# Patient Record
Sex: MALE | Race: WHITE | Employment: OTHER | URBAN - METROPOLITAN AREA
[De-identification: names, ages, dates, MRNs, and addresses within clinical notes are randomized per-mention and may not be internally consistent; named-entity substitution may affect disease eponyms.]

---

## 2019-09-05 ENCOUNTER — HOSPITAL ENCOUNTER (OUTPATIENT)
Dept: PHYSICAL THERAPY | Age: 39
Discharge: HOME | End: 2019-09-05
Payer: COMMERCIAL

## 2019-09-05 PROCEDURE — 97110 THERAPEUTIC EXERCISES: CPT

## 2019-09-05 PROCEDURE — 97161 PT EVAL LOW COMPLEX 20 MIN: CPT

## 2019-09-05 NOTE — PROGRESS NOTES
[address] [facility] PHYSICAL THERAPY AT [facility] [address] [facility] [address] - Phone: [phone]  Fax: [phone] / [address] [facility]  Patient Name: Jhony Mandel : 1980   Medical   Diagnosis: Left shoulder pain [M25.512] Treatment Diagnosis: L shoulder rotator cuff tear   Onset Date:       Referral Source: Nikole Lau MD Start of Care [facility]): 2019   Prior Hospitalization: See medical history Provider #: [id number]   Prior Level of Function: Chronic L shoulder pain    Comorbidities: Tobacco use   Medications: Verified on Patient Summary List   The Plan of Care and following information is based on the information from the initial evaluation.   ==============================================================================  Assessment / key information:   Jhony Mandel is a 37 y.o. male with a chief c/o constant R shoulder pain, up to 10/10. The patient reports 3-4 years of L shoulder pain form multiple injuries. He reports a recent MRI shows a small RCT,and OA. He reports episodes of what sound like subluxations, ewith brief intense pain, usually with overhead, horizontal adduction or ER at 90 degrees of abduction. He has full AROM, except for functional reach behind his back (MRE), which is limited to T9 (T4 R). His strength is good, for all planes except for abd, which is 4+/5, limited by pain. FOTO score = 52.   The patient would benefit from skilled physical therapy at this time.  ===========================================================================================  Eval Complexity: History LOW Complexity : Zero comorbidities / personal factors that will impact the outcome / POC;  Examination  HIGH Complexity : 4+ Standardized tests and measures addressing body structure, function, activity limitation and / or participation in recreation ; Presentation LOW Complexity : Stable, uncomplicated ;  Decision Making MEDIUM Complexity : FOTO score of 26-74; Overall Complexity LOW   Problem List: pain affecting function, decrease ROM, decrease strength, decrease ADL/ functional abilitiies, decrease activity tolerance and decrease flexibility/ joint mobility   Treatment Plan may include any combination of the following: Therapeutic exercise, Therapeutic activities, Neuromuscular re-education, Physical agent/modality, Manual therapy and Patient education  Patient / Family readiness to learn indicated by: asking questions, trying to perform skills and interest  Persons(s) to be included in education: patient (P)  Barriers to Learning/Limitations: None  Measures taken, if barriers to learning:    Patient Goal (s): \"reduction of pain\"   Patient self reported health status: fair  Rehabilitation Potential: good   Short Term Goals: To be accomplished in  2  weeks:  1. Decrease max L shoulder pain to < 6/10  2. Pt compliant with HEP   Long Term Goals: To be accomplished in  4-5  weeks:  1. All ADL's without L shoulder pain  2. Increase FOTO score to > 65, to indicate increased function  3. Pt able to consistently perform overhead work duties without pain  4. Patient Independent with HEP/selfcare    Frequency / Duration:   Patient to be seen 2-3  times per week for 4-8  weeks:  Patient / Caregiver education and instruction: self care, activity modification and exercises  Therapist Signature: Dunia Thomson PT Date: 6/2/6529   Certification Period: NA Time: 4:36 PM   ===========================================================================================  I certify that the above Physical Therapy Services are being furnished while the patient is under my care. I agree with the treatment plan and certify that this therapy is necessary.     Physician Signature:        Date:       Time:     Please sign and return to [facility] or you may fax the signed copy to [phone]. Thank you.

## 2019-09-05 NOTE — PROGRESS NOTES
PHYSICAL THERAPY - DAILY TREATMENT NOTE    Patient Name: Denny Price        Date: 2019  : 1980   YES Patient  Verified  Visit #:     Insurance: Payor: /      In time: 3:50 Out time: 4:30   Total Treatment Time: 40     Medicare Time Tracking (below)   Total Timed Codes (min):  15 1:1 Treatment Time:  15     TREATMENT AREA =  L shoulder    SUBJECTIVE    Pain Level (on 0 to 10 scale):  3  / 10   Medication Changes/New allergies or changes in medical history, any new surgeries or procedures?     NO    If yes, update Summary List   Subjective Functional Status/Changes:  []  No changes reported     See eval          OBJECTIVE  Modalities Rationale:     decrease edema, decrease inflammation and decrease pain to improve patient's ability to perform ADLs without pain   min [] Estim, type/location:                                      []  att     []  unatt     []  w/US     []  w/ice    []  w/heat    min []  Mechanical Traction: type/lbs                   []  pro   []  sup   []  int   []  cont    []  before manual    []  after manual    min []  Ultrasound, settings/location:      min []  Iontophoresis w/ dexamethasone, location:                                               []  take home patch       []  in clinic   10 min [x]  Ice     []  Heat    location/position:     min []  Vasopneumatic Device, press/temp:     min []  Other:    [x] Skin assessment post-treatment (if applicable):    [x]  intact    []  redness- no adverse reaction     []redness  adverse reaction:        15 min Therapeutic Exercise:  [x]  See flow sheet   Rationale:      increase ROM and improve coordination to improve the patients ability to perform ADLs without pain     Billed With/As:   [x] TE   [] TA   [] Neuro   [] Self Care Patient Education: [x] Review HEP    [] Progressed/Changed HEP based on:   [] positioning   [] body mechanics   [] transfers   [] heat/ice application    [] other:       min Patient Education:  Yanique Wagoner Reviewed HEP   []  Progressed/Changed HEP based on:         Other Objective/Functional Measures:    See eval     Post Treatment Pain Level (on 0 to 10) scale:   1  / 10     ASSESSMENT    []  See Progress Note/Recertification   Patient will continue to benefit from skilled therapy to address remaining functional deficits: see eval   Progress toward goals / Updated goals:    -     PLAN    [x]  Upgrade activities as tolerated YES Continue plan of care   []  Discharge due to :    []  Other:      Therapist: Emely Londono PT    Date: 9/5/2019 Time: 8:21 AM

## 2019-09-11 ENCOUNTER — HOSPITAL ENCOUNTER (OUTPATIENT)
Dept: PHYSICAL THERAPY | Age: 39
Discharge: HOME | End: 2019-09-11
Payer: COMMERCIAL

## 2019-09-11 PROCEDURE — 97140 MANUAL THERAPY 1/> REGIONS: CPT

## 2019-09-11 PROCEDURE — 97110 THERAPEUTIC EXERCISES: CPT

## 2019-09-11 NOTE — PROGRESS NOTES
PHYSICAL THERAPY - DAILY TREATMENT NOTE     Patient Name: Bravo Cuevas        Date: 2019  : 1980   YES Patient  Verified  Visit #:     Insurance: [facility]or: [facility] [address] / Plan: [address] / Product Type: Managed Care Medicaid /      In time:  Out time: 1100   Total Treatment Time: 55     Medicare/BCBS Time Tracking (below)   Total Timed Codes (min):   1:1 Treatment Time:       TREATMENT AREA =  Left shoulder pain [M25.512]    SUBJECTIVE    Pain Level (on 0 to 10 scale):  3  / 10   Medication Changes/New allergies or changes in medical history, any new surgeries or procedures?     NO    If yes, update Summary List   Subjective Functional Status/Changes:  []  No changes reported   Pain when I use the arm           OBJECTIVE  Modalities Rationale:     decrease inflammation and decrease pain to improve patient's ability to use arm with less pain      min [] Estim, type/location:                                      []  att     []  unatt     []  w/US     []  w/ice    []  w/heat    min []  Mechanical Traction: type/lbs                   []  pro   []  sup   []  int   []  cont    []  before manual    []  after manual    min []  Ultrasound, settings/location:      min []  Iontophoresis w/ dexamethasone, location:                                               []  take home patch       []  in clinic   10 min [x]  Ice     []  Heat    location/position:     min []  Vasopneumatic Device, press/temp:     min []  Other:    [x] Skin assessment post-treatment (if applicable):    [x]  intact    []  redness- no adverse reaction     []redness  adverse reaction:      5 min Manual Therapy: Technique:      [] S/DTM []IASTM []PROM [] Passive Stretching   []manual TPR  [] SOR [] man traction  []Jt manipulation:Gr I [] II []  III [] IV[]   [] OP with REIL    []   Treatment Area:     Rationale:      decrease pain, increase ROM, increase tissue extensibility and decrease trigger points to improve patient's ability to perform ADLs/ recreational activity with less pain    40 min Therapeutic Exercise:  [x]  See flow sheet   Rationale:      increase ROM and increase strength to improve the patients ability to perform ADLs/ recreational activity without pain         Billed With/As:   [] TE   [] TA   [] Neuro   [] Self Care Patient Education: [x] Review HEP    [] Progressed/Changed HEP based on:   [] positioning   [] body mechanics   [] transfers   [] heat/ice application    [] other:        Other Objective/Functional Measures:    Progressed HEP. See flow sheet   Post Treatment Pain Level (on 0 to 10) scale:   3  / 10     ASSESSMENT  Assessment/Changes in Function:      Functional improvement:  progressed HEP  Functional limitation: work activity requires working in awkward positions and with sign force           Patient will continue to benefit from skilled PT services to modify and progress therapeutic interventions, address functional mobility deficits, address ROM deficits, address strength deficits, analyze and address soft tissue restrictions and analyze and cue movement patterns to attain remaining goals.    Progress toward goals / Updated goals:    Good Progress to    [] STG    [x] LTG  4 as shown by progression of there ex     []  See Progress Note/Recertification      PLAN    [x]  Upgrade activities as tolerated {YES) Continue plan of care   []  Discharge due to :    []  Other:      Therapist: Mike Oh PT    Date: 9/11/2019 Time: 10:09 AM     Future Appointments   Date Time Provider Yrn Goncalves   9/13/2019 11:00 AM Evelia Yip PT REHAB CENTER AT [facility]   9/18/2019 12:30 PM Alyson Rasmussen PT REHAB CENTER AT [facility]   9/20/2019  9:00 AM Gissel Millan PT REHAB CENTER AT [facility]   9/25/2019  8:30 AM Gissel Millan PT REHAB CENTER AT [facility]   9/27/2019 11:30 AM Evelia Yip, PT REHAB CENTER AT [facility]

## 2019-09-13 ENCOUNTER — HOSPITAL ENCOUNTER (OUTPATIENT)
Dept: PHYSICAL THERAPY | Age: 39
Discharge: HOME | End: 2019-09-13
Payer: COMMERCIAL

## 2019-09-13 PROCEDURE — 97110 THERAPEUTIC EXERCISES: CPT

## 2019-09-13 NOTE — PROGRESS NOTES
PHYSICAL THERAPY - DAILY TREATMENT NOTE     Patient Name: Miranda Confer        Date: 2019  : 1980   YES Patient  Verified  Visit #:   3   of   12  Insurance: Payor: [address] [place] [address] Road / Plan: [address] / Product Type: Managed Care Medicaid /      In time: 46 Out time: 111   Total Treatment Time: 55     Medicare/[employer] Time Tracking (below)   Total Timed Codes (min):   1:1 Treatment Time:       TREATMENT AREA =  Left shoulder pain [M25.512]    SUBJECTIVE    Pain Level (on 0 to 10 scale):  3  / 10   Medication Changes/New allergies or changes in medical history, any new surgeries or procedures? NO    If yes, update Summary List   Subjective Functional Status/Changes:  []  No changes reported   Pt 15 min late for scheduled appt. Noting limited changes in function but has been doing his HEP and noting improvements in level of activity performed.            OBJECTIVE  Modalities Rationale:     decrease inflammation and decrease pain to improve patient's ability to use arm with less pain   min [] Estim, type/location:                                      []  att     []  unatt     []  w/US     []  w/ice    []  w/heat    min []  Mechanical Traction: type/lbs                   []  pro   []  sup   []  int   []  cont    []  before manual    []  after manual    min []  Ultrasound, settings/location:      min []  Iontophoresis w/ dexamethasone, location:                                               []  take home patch       []  in clinic   10 min [x]  Ice     []  Heat    location/position: L shoulder in supine    min []  Vasopneumatic Device, press/temp:     min []  Other:    [x] Skin assessment post-treatment (if applicable):    [x]  intact    [x]  redness- no adverse reaction     []redness  adverse reaction:      5 min Manual Therapy: Technique:      [] S/DTM []IASTM []PROM [] Passive Stretching   []manual TPR  [] SOR [] man traction  []Jt manipulation:Gr I [] II []  III [] IV[] [] OP with REIL    []   Treatment Area:  L UT, pec and lat release   Rationale:      decrease pain, increase ROM, increase tissue extensibility and decrease trigger points   to improve patient's ability to perform ADLs, reaching, and lifting with less pain    40 min Therapeutic Exercise:  [x]  See flow sheet   Rationale:      increase ROM and increase strength   to improve patients ability to perform ADLs/ recreational activity without pain     Billed With/As:   [x] TE   [] TA   [] Neuro   [] Self Care Patient Education: [x] Review HEP    [] Progressed/Changed HEP based on:   [] positioning   [] body mechanics   [] transfers   [] heat/ice application    [] other:        Other Objective/Functional Measures:    Progressed there ex with increased ed on alignment and activation during S/L ER, KBBU with static and ER. Post Treatment Pain Level (on 0 to 10) scale:   3  / 10     ASSESSMENT  Assessment/Changes in Function:      Pt challenged with KBBU to recruit scap stabilizers. Would benefit from LTR at next visit for KB progression. Patient will continue to benefit from skilled PT services to modify and progress therapeutic interventions, address functional mobility deficits, address ROM deficits, address strength deficits, analyze and address soft tissue restrictions and analyze and cue movement patterns to attain remaining goals.    Progress toward goals / Updated goals:    Good Progress to    [] STG    [x] LTG  4 as shown by progression of there ex and HEP.     []  See Progress Note/Recertification      PLAN    [x]  Upgrade activities as tolerated {YES) Continue plan of care   []  Discharge due to :    []  Other:      Therapist: Francesca Mohamud PT    Date: 9/13/2019 Time: 11 AM     Future Appointments   Date Time Provider Yrn Goncalves   9/18/2019 12:30 PM Lizzy Sabillon, PT REHAB CENTER AT [address]   9/20/2019  9:00 AM Zachary Haley, PT REHAB CENTER AT [address]   9/25/2019  8:30 AM Zachary Haley, PT REHAB CENTER AT [address] 9/27/2019 11:30 AM Ginette Iqbal PT REHAB CENTER AT [facility]

## 2019-09-25 ENCOUNTER — APPOINTMENT (OUTPATIENT)
Dept: PHYSICAL THERAPY | Age: 39
End: 2019-09-25
Payer: COMMERCIAL

## 2019-09-27 ENCOUNTER — APPOINTMENT (OUTPATIENT)
Dept: PHYSICAL THERAPY | Age: 39
End: 2019-09-27
Payer: COMMERCIAL

## 2019-10-02 ENCOUNTER — APPOINTMENT (OUTPATIENT)
Dept: PHYSICAL THERAPY | Age: 39
End: 2019-10-02
Payer: COMMERCIAL

## 2019-10-16 ENCOUNTER — HOSPITAL ENCOUNTER (OUTPATIENT)
Dept: PHYSICAL THERAPY | Age: 39
Discharge: HOME | End: 2019-10-16
Payer: COMMERCIAL

## 2019-10-16 PROCEDURE — 97140 MANUAL THERAPY 1/> REGIONS: CPT

## 2019-10-16 PROCEDURE — 97110 THERAPEUTIC EXERCISES: CPT

## 2019-10-16 PROCEDURE — 97014 ELECTRIC STIMULATION THERAPY: CPT

## 2019-10-16 NOTE — PROGRESS NOTES
PHYSICAL THERAPY - DAILY TREATMENT NOTE     Patient Name: Nydia Gary        Date: 10/16/2019  : 1980   YES Patient  Verified  Visit #:     Insurance: Payor: [address] [place] [address] Road / Plan: [address] / Product Type: Managed Care Medicaid /      In time: 332 Out time: 140   Total Treatment Time: 60     Medicare/[employer] Time Tracking (below)   Total Timed Codes (min):   1:1 Treatment Time:       TREATMENT AREA =  Left shoulder pain [M25.512]    SUBJECTIVE    Pain Level (on 0 to 10 scale):  3  / 10   Medication Changes/New allergies or changes in medical history, any new surgeries or procedures?     NO    If yes, update Summary List   Subjective Functional Status/Changes:  []  No changes reported   See PN       OBJECTIVE  Modalities Rationale:     decrease inflammation and decrease pain to improve patient's ability to use arm with less pain  15 min [x] Estim, type/location: L shoulder in supine                                     []  att     [x]  unatt     []  w/US     []  w/ice    []  w/heat    min []  Mechanical Traction: type/lbs                   []  pro   []  sup   []  int   []  cont    []  before manual    []  after manual    min []  Ultrasound, settings/location:      min []  Iontophoresis w/ dexamethasone, location:                                               []  take home patch       []  in clinic     min []  Ice     []  Heat    location/position:     min []  Vasopneumatic Device, press/temp:     min []  Other:    [x] Skin assessment post-treatment (if applicable):    [x]  intact    [x]  redness- no adverse reaction     []redness  adverse reaction:      10 min Manual Therapy: Technique:      [] S/DTM []IASTM []PROM [] Passive Stretching   []manual TPR  [] SOR [] man traction  []Jt manipulation:Gr I [] II []  III [] IV[]   [] OP with REIL    []   Treatment Area:  L UT, pec and lat release   Rationale:      decrease pain, increase ROM, increase tissue extensibility and decrease trigger points   to improve patient's ability to perform ADLs, reaching, and lifting with less pain    35 min Therapeutic Exercise:  [x]  See flow sheet   Rationale:      increase ROM and increase strength   to improve patients ability to perform ADLs/ recreational activity without pain     Billed With/As:   [x] TE   [] TA   [] Neuro   [] Self Care Patient Education: [x] Review HEP    [] Progressed/Changed HEP based on:   [] positioning   [] body mechanics   [] transfers   [] heat/ice application    [] other:        Other Objective/Functional Measures:  See PN. Post Treatment Pain Level (on 0 to 10) scale:   3  / 10     ASSESSMENT  Assessment/Changes in Function:   See PN. Patient will continue to benefit from skilled PT services to modify and progress therapeutic interventions, address functional mobility deficits, address ROM deficits, address strength deficits, analyze and address soft tissue restrictions and analyze and cue movement patterns to attain remaining goals.    Progress toward goals / Updated goals:    See PN.        PLAN    [x]  Upgrade activities as tolerated {YES) Continue plan of care   []  Discharge due to :    []  Other:      Therapist: Jewel Izquierdo PT    Date: 10/16/2019 Time: 12:14 pm     Future Appointments   Date Time Provider Yrn Goncalves   10/16/2019 12:30 PM Vj Nuñez, PT REH[facility]ENTER AT [facility]   10/23/2019 10:30 AM Tereso Rico, PT REHAB CENTER AT [facility]   10/25/2019  1:00 PM Isaiah Bruner, PT REHAB CENTER AT [facility]

## 2019-10-16 NOTE — PROGRESS NOTES
[address] PHYSICAL THERAPY AT [address], [address] [place] [address], [address], [address] [place] [address]  Phone: [phone]  Fax: [phone]  PROGRESS NOTE  Patient Name: Veronica Reed : 1980   Treatment/Medical Diagnosis: Left shoulder pain [M25.512]   Referral Source: Jeff Tate MD     Date of Initial Visit: 19 Attended Visits: 4 Missed Visits: 3     SUMMARY OF TREATMENT   Veronica Reed has been seen at our clinic 2-3x/wk for a total of 4 visits. Pt treatment has consisted of therapeutic exercise for shoulder strength and ROM, postural correction, and manual therapy (deep tissue mobilizations and GHJ, cervical/thoracic mobiliizations). He was unable to attend PT > 3 visits since initial evaluation secondary to elbow injury. CURRENT STATUS   Pt has had a good response to physical therapy treatment. Pt reports improved ROM with less pain in ranges~ 90 degress. However, he continues to complain of difficulty and increase pain with elevation > shoulder height. Dysfunctional and painful hand behind back (MRE) to T9 and hand behind head (LRF). He demonstrates poor scapulohumeral mechanics. His strength is reduced for ER and abd, which is 4+/5, limited by pain. He is unable to perform SA, LT activation without verbal and tactile cuing. FOTO score = 67, improved from 52. Previous Goals:  1. Decrease max L shoulder pain to < 6/10  2. Pt compliant with HEP   Prior Level/Current Level:  1) Prior Level: 8   Current Level: 5   Goal Met? yes  2) Prior Level: n/a   Current Level: Independent    Goal Met? yes      ·  Long Term Goals: To be accomplished in  4  weeks:  1. All ADL's without L shoulder pain  2. Increase FOTO score to > 65, to indicate increased function  3. Pt able to consistently perform overhead work duties without pain  4.   Patient Independent with HEP/selfcare    RECOMMENDATIONS   Continue therapy as per initial plan/protocol with frequency 2x/week x 4 weeks.       If you have any questions/comments please contact us directly at [phone]. Thank you for allowing us to assist in the care of your patient. Therapist Signature: Trish Guzman PT Date: 10/16/2019   Reporting Period:      9/5/19-10/16/19    Time: 9:11 AM   NOTE TO PHYSICIAN:  PLEASE COMPLETE THE ORDERS BELOW AND FAX TO   [facility] Physical Therapy: [phone] If you are unable to process this request in 24 hours please contact our office: [phone].    ___ I have read the above report and request that my patient continue as recommended.   ___ I have read the above report and request that my patient continue therapy with the following changes/special instructions:_________________________________________________________   ___ I have read the above report and request that my patient be discharged from therapy.      Physician Signature:        Date:       Time:

## 2020-01-06 NOTE — PROGRESS NOTES
[address] [facility]  PHYSICAL THERAPY AT [address], [address] [place] [address], [address], [address] [place] [address]  Phone: [phone]  Fax: [phone] SUMMARY  Patient Name: Devin Silvestre : 1980   Treatment/Medical Diagnosis: Left shoulder pain [M25.512]   Referral Source: Orestes Giordano MD     Date of Initial Visit: 19 Attended Visits: 4 Missed Visits: 3     SUMMARY OF TREATMENT   Devin Silvestre has been seen at our clinic 2-3x/wk for a total of 4 visits. Pt treatment has consisted of therapeutic exercise for shoulder strength and ROM, postural correction, and manual therapy (deep tissue mobilizations and GHJ, cervical/thoracic mobiliizations). CURRENT STATUS   Pt had a good response to physical therapy treatment. However, he has not returned for follow up since last visit 10/16/19. Will be D/C.     RECOMMENDATIONS   D/C at this time secondary to lack of attendance. If you have any questions/comments please contact us directly at [phone]. Thank you for allowing us to assist in the care of your patient. Therapist Signature: Ja Coates PT Date:    Reporting Period:      19-10/16/19    Time: 9:11 AM   NOTE TO PHYSICIAN:  PLEASE COMPLETE THE ORDERS BELOW AND FAX TO   [facility] Physical Therapy: [phone] If you are unable to process this request in 24 hours please contact our office: [phone].    ___ I have read the above report and request that my patient continue as recommended.   ___ I have read the above report and request that my patient continue therapy with the following changes/special instructions:_________________________________________________________   ___ I have read the above report and request that my patient be discharged from therapy.      Physician Signature:        Date:       Time:

## 2022-02-11 ENCOUNTER — OFFICE VISIT (OUTPATIENT)
Dept: ORTHOPEDIC SURGERY | Age: 42
End: 2022-02-11
Payer: COMMERCIAL

## 2022-02-11 VITALS
TEMPERATURE: 97.5 F | HEIGHT: 68 IN | BODY MASS INDEX: 26.1 KG/M2 | HEART RATE: 88 BPM | OXYGEN SATURATION: 99 % | WEIGHT: 172.2 LBS

## 2022-02-11 DIAGNOSIS — M19.012 PRIMARY OSTEOARTHRITIS OF LEFT SHOULDER: ICD-10-CM

## 2022-02-11 DIAGNOSIS — M25.512 LEFT SHOULDER PAIN, UNSPECIFIED CHRONICITY: Primary | ICD-10-CM

## 2022-02-11 PROCEDURE — 99204 OFFICE O/P NEW MOD 45 MIN: CPT | Performed by: ORTHOPAEDIC SURGERY

## 2022-02-11 PROCEDURE — 20610 DRAIN/INJ JOINT/BURSA W/O US: CPT | Performed by: ORTHOPAEDIC SURGERY

## 2022-02-11 PROCEDURE — 73030 X-RAY EXAM OF SHOULDER: CPT | Performed by: ORTHOPAEDIC SURGERY

## 2022-02-11 RX ORDER — ESZOPICLONE 3 MG/1
TABLET, FILM COATED ORAL
COMMUNITY
Start: 2022-01-26

## 2022-02-11 RX ORDER — GABAPENTIN 400 MG/1
CAPSULE ORAL
COMMUNITY
Start: 2022-01-26

## 2022-02-11 RX ORDER — MELOXICAM 15 MG/1
15 TABLET ORAL DAILY
Qty: 30 TABLET | Refills: 4 | Status: SHIPPED | OUTPATIENT
Start: 2022-02-11

## 2022-02-11 RX ORDER — HYDROCODONE BITARTRATE AND ACETAMINOPHEN 10; 325 MG/1; MG/1
1 TABLET ORAL
Qty: 21 TABLET | Refills: 0 | Status: SHIPPED | OUTPATIENT
Start: 2022-02-11 | End: 2022-02-18

## 2022-02-11 RX ORDER — OMEPRAZOLE 40 MG/1
40 CAPSULE, DELAYED RELEASE ORAL DAILY
COMMUNITY
Start: 2022-01-19

## 2022-02-11 RX ORDER — FAMOTIDINE 20 MG/1
TABLET, FILM COATED ORAL
COMMUNITY
Start: 2022-02-10

## 2022-02-11 RX ORDER — ACYCLOVIR 400 MG/1
TABLET ORAL
COMMUNITY
Start: 2022-01-26

## 2022-02-11 RX ORDER — DIPHENHYDRAMINE HCL 25 MG
50 CAPSULE ORAL
COMMUNITY

## 2022-02-11 RX ORDER — BETAMETHASONE SODIUM PHOSPHATE AND BETAMETHASONE ACETATE 3; 3 MG/ML; MG/ML
6 INJECTION, SUSPENSION INTRA-ARTICULAR; INTRALESIONAL; INTRAMUSCULAR; SOFT TISSUE ONCE
Status: COMPLETED | OUTPATIENT
Start: 2022-02-11 | End: 2022-02-11

## 2022-02-11 RX ORDER — ALBUTEROL SULFATE 90 UG/1
AEROSOL, METERED RESPIRATORY (INHALATION)
COMMUNITY
Start: 2022-01-21

## 2022-02-11 RX ORDER — HYDROCODONE BITARTRATE AND ACETAMINOPHEN 10; 325 MG/1; MG/1
1 TABLET ORAL
COMMUNITY
End: 2022-02-11 | Stop reason: SDUPTHER

## 2022-02-11 RX ORDER — PSEUDOEPHEDRINE HCL 30 MG
60 TABLET ORAL
COMMUNITY

## 2022-02-11 RX ORDER — ROSUVASTATIN CALCIUM 5 MG/1
5 TABLET, COATED ORAL DAILY
COMMUNITY
Start: 2022-01-17

## 2022-02-11 RX ORDER — LOPERAMIDE HYDROCHLORIDE 2 MG/1
2 CAPSULE ORAL 2 TIMES DAILY
COMMUNITY
Start: 2022-01-19

## 2022-02-11 RX ADMIN — BETAMETHASONE SODIUM PHOSPHATE AND BETAMETHASONE ACETATE 6 MG: 3; 3 INJECTION, SUSPENSION INTRA-ARTICULAR; INTRALESIONAL; INTRAMUSCULAR; SOFT TISSUE at 15:46

## 2022-02-15 ENCOUNTER — TELEPHONE (OUTPATIENT)
Dept: ORTHOPEDIC SURGERY | Age: 42
End: 2022-02-15

## 2022-02-15 NOTE — TELEPHONE ENCOUNTER
Patient called in stating \"he needs a letter for work stating he's only to perform light duty only\"    He's requesting a call back when the letter is ready at [phone]

## 2022-02-15 NOTE — LETTER
NOTIFICATION RETURN TO WORK / SCHOOL    2/17/2022 2:43 PM    Mr. Hawa Haley  [facility] [address]      To Whom It May Concern:    Hawa Haley is currently under the care of [address] [place] [address] Tenzin Viyera. He will return to work with light duty restrictions until his next follow up visit. If there are questions or concerns please have the patient contact our office.         Sincerely,      DANNA Goodman

## 2022-02-17 NOTE — TELEPHONE ENCOUNTER
Call and informed patient that work letter was ready for . Patient verberalized understanding and will get it form his [employer].

## 2023-01-07 NOTE — PROGRESS NOTES
Chandrika Drummond  1980   No chief complaint on file. HISTORY OF PRESENT ILLNESS  Chandrika Drummond is a 35 y.o. male who presents today for evaluation of left shoulder pain. He has had problems for over 8 years. He reports many injuries to his shoulder over the years. His pain is sharp with heavy lifting, overhead work and after a long day at work he has an achy pain that doesn't go away. The pain is deep in the shoulder and has a burning quality to it. He has been seen by multiple providers for his shoulder. He has had cortisone injections which last a couple weeks. He has been through PT for his shoulder and after a couple months he was doing so well they told him he could continue his exercises at home. He has taken OTC NSAIDs and tylenol none of which give much relief. He was told by another provider he has OA and will need a shoulder replacement which he is not interested in. He has a labor intensive job and needs to work. No recent injuries or events that made the pain worse. 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 He was also a  which caused a lot of pain as well. He denies numbness, tingling, radiating pain, dislocations of the shoulder. Allergies   Allergen Reactions    Codeine Nausea and Vomiting        No past medical history on file.    Social History     Socioeconomic History    Marital status: SINGLE     Spouse name: Not on file    Number of children: Not on file    Years of education: Not on file    Highest education level: Not on file   Occupational History    Not on file   Tobacco Use    Smoking status: Not on file    Smokeless tobacco: Not on file   Substance and Sexual Activity    Alcohol use: Not on file    Drug use: Not on file    Sexual activity: Not on file   Other Topics Concern    Not on file   Social History Narrative    Not on file     Social Determinants of Health     Financial Resource Strain:     Difficulty of Paying Living Expenses: Not on file   Food Insecurity:     Worried About Running Out of Food in the Last Year: Not on file    Livia of Food in the Last Year: Not on file   Transportation Needs:     Lack of Transportation (Medical): Not on file    Lack of Transportation (Non-Medical): Not on file   Physical Activity:     Days of Exercise per Week: Not on file    Minutes of Exercise per Session: Not on file   Stress:     Feeling of Stress : Not on file   Social Connections:     Frequency of Communication with Friends and Family: Not on file    Frequency of Social Gatherings with Friends and Family: Not on file    Attends [employer] Services: Not on file    Active Member of [address] [place] [address] [employer] or Organizations: Not on file    Attends Club or Organization Meetings: Not on file    Marital Status: Not on file   Intimate Partner Violence:     Fear of Current or Ex-Partner: Not on file    Emotionally Abused: Not on file    Physically Abused: Not on file    Sexually Abused: Not on file   Housing Stability:     Unable to Pay for Housing in the Last Year: Not on file    Number of [place] in the Last Year: Not on file    Unstable Housing in the Last Year: Not on file      No past surgical history on file. No family history on file. No current outpatient medications on file. No current facility-administered medications for this visit. REVIEW OF SYSTEM   Patient denies: Weight loss, Fever/Chills, HA, Visual changes, Fatigue, Chest pain, SOB, Abdominal pain, N/V/D/C, Blood in stool or urine, Edema. Pertinent positive as above in HPI. All others were negative    PHYSICAL EXAM:   There were no vitals taken for this visit. The patient is a well-developed, well-nourished male   in no acute distress. The patient is alert and oriented times three. The patient is alert and oriented times three.  Mood and affect are normal.  LYMPHATIC: lymph nodes are not enlarged and are within normal limits  SKIN: normal in color and non tender to palpation. There are no bruises or abrasions noted. NEUROLOGICAL: Motor sensory exam is within normal limits. Reflexes are equal bilaterally. There is normal sensation to pinprick and light touch  MUSCULOSKELETAL:    Examination Left shoulder   Skin Intact   AC joint tenderness -   Biceps tenderness -   Forward flexion/Elevation    Active abduction    Glenohumeral abduction 90   External rotation ROM 90   Internal rotation ROM 70   Apprehension Not assessed   Victor Ms Relocation Not assessed   Jerk Not assessed   Load and Shift Not assessed   Obriens Not assessed   Speeds -   Impingement sign -   Supraspinatus/Empty Can +, 5/5   External Rotation Strength -, 5/5   Lift Off/Belly Press +, 5/5   Neurovascular Intact     Crepitus noted on exam     PROCEDURE: After sterile prep, 3 cc of Xylocaine and 1 cc of Celestone were injected into the left intraarticular shoulder.        [facility] - [facility]  OFFICE PROCEDURE PROGRESS NOTE        Chart reviewed for the following:  Piyush FU PA, have reviewed the History, Physical and updated the Allergic reactions for [id number] performed immediately prior to start of procedure:  Piyush FU PA-C, have performed the following reviews on [facility] prior to the start of the procedure:            * Patient was identified by name and date of birth   * Agreement on procedure being performed was verified  * Risks and Benefits explained to the patient  * Procedure site verified and marked as necessary  * Patient was positioned for comfort  * Consent was signed and verified     Time: 3:59 PM    Date of procedure: 2022    Procedure performed by:  DANNA Turner    Provider assisted by: (see medication administration)    How tolerated by patient: tolerated the procedure well with no complications    Comments: none    IMAGIN views of the left shoulder 22 show mild/moderate OA, joint space narrowing noted    IMPRESSION:  No diagnosis found. PLAN:   Pt having left shoulder pain  I think his pain is coming from OA  He is to young for a shoulder replacement and is not interested in that at this time. I do not recommend this right now and would not get any advanced imaging based on his exam today. He has some pain with rotator cuff testing but great strength. He has tried a lot of conservative treatment for this and gets temporary relief. He was given a cortisone injection today. He tolerated it well. He was give a prescription for mobic and one time rx for pain medication. He would like a pain management referral.  He will continue exercises at home to avoid stiffness since he has completed PT for his shoulder.   RTC PRN      [facility] Spontaneous, unlabored and symmetrical

## 2023-02-10 DIAGNOSIS — M19.012 PRIMARY OSTEOARTHRITIS, LEFT SHOULDER: Primary | ICD-10-CM
